# Patient Record
Sex: MALE | Race: WHITE | NOT HISPANIC OR LATINO | Employment: OTHER | URBAN - METROPOLITAN AREA
[De-identification: names, ages, dates, MRNs, and addresses within clinical notes are randomized per-mention and may not be internally consistent; named-entity substitution may affect disease eponyms.]

---

## 2018-02-19 ENCOUNTER — TRANSCRIBE ORDERS (OUTPATIENT)
Dept: ADMINISTRATIVE | Facility: HOSPITAL | Age: 78
End: 2018-02-19

## 2018-02-19 ENCOUNTER — HOSPITAL ENCOUNTER (OUTPATIENT)
Dept: GENERAL RADIOLOGY | Facility: HOSPITAL | Age: 78
Discharge: HOME | End: 2018-02-19
Attending: FAMILY MEDICINE | Admitting: FAMILY MEDICINE

## 2018-02-19 DIAGNOSIS — M25.552 LEFT HIP PAIN: ICD-10-CM

## 2018-02-19 DIAGNOSIS — M25.552 LEFT HIP PAIN: Primary | ICD-10-CM

## 2018-02-19 PROCEDURE — 73502 X-RAY EXAM HIP UNI 2-3 VIEWS: CPT

## 2018-10-19 ENCOUNTER — TRANSCRIBE ORDERS (OUTPATIENT)
Dept: ADMINISTRATIVE | Facility: HOSPITAL | Age: 78
End: 2018-10-19

## 2018-10-19 DIAGNOSIS — R10.9 ABDOMINAL PAIN, UNSPECIFIED ABDOMINAL LOCATION: Primary | ICD-10-CM

## 2018-10-23 ENCOUNTER — HOSPITAL ENCOUNTER (OUTPATIENT)
Dept: CT IMAGING | Facility: HOSPITAL | Age: 78
Discharge: HOME | End: 2018-10-23
Attending: FAMILY MEDICINE | Admitting: FAMILY MEDICINE

## 2018-10-23 DIAGNOSIS — R10.9 ABDOMINAL PAIN, UNSPECIFIED ABDOMINAL LOCATION: ICD-10-CM

## 2018-10-23 PROCEDURE — 74176 CT ABD & PELVIS W/O CONTRAST: CPT

## 2019-02-01 ENCOUNTER — TRANSCRIBE ORDERS (OUTPATIENT)
Dept: PHYSICAL THERAPY | Facility: HOSPITAL | Age: 79
End: 2019-02-01

## 2019-02-01 DIAGNOSIS — M16.9 OSTEOARTHRITIS OF HIP, UNSPECIFIED LATERALITY, UNSPECIFIED OSTEOARTHRITIS TYPE: Primary | ICD-10-CM

## 2019-02-04 ENCOUNTER — HOSPITAL ENCOUNTER (OUTPATIENT)
Dept: PHYSICAL THERAPY | Facility: HOSPITAL | Age: 79
Setting detail: THERAPIES SERIES
Discharge: HOME | End: 2019-02-04

## 2019-02-04 DIAGNOSIS — M25.552 BILATERAL HIP PAIN: Primary | ICD-10-CM

## 2019-02-04 DIAGNOSIS — M25.551 BILATERAL HIP PAIN: Primary | ICD-10-CM

## 2019-02-04 PROCEDURE — 97161 PT EVAL LOW COMPLEX 20 MIN: CPT | Performed by: PHYSICAL THERAPIST

## 2019-02-04 NOTE — THERAPY EVALUATION
Outpatient Physical Therapy Ortho Initial Evaluation  [facility]     Patient Name: Claude G Rhorer Jr.  : 1940  MRN: [id number]  Today's Date: 2019      Visit Date: 2019    There is no problem list on file for this patient.       Past Medical History:   Diagnosis Date   • Hypertension         Past Surgical History:   Procedure Laterality Date   • CHOLECYSTECTOMY     • HERNIA REPAIR         Visit Dx:     ICD-10-CM ICD-9-CM   1. Bilateral hip pain M25.551 719.45    M25.552        Patient History     Row Name 19 1300             History    Chief Complaint  Pain;Difficulty Walking  -LIGIA      Type of Pain  Hip pain  -LIGIA      Date Current Problem(s) Began  17  -LIGIA      Brief Description of Current Complaint  Patient states that he has a history of left hip pain and has been receiving injections about every three months to decreasing benefit.  He notes that his left pain radiates into the groin and lateral hip.  He notes that even his right hip has began to hurt him over about jet last year.  He had an injection in it earlier this year.  Was told he has a right sided hernia by the surgeon that worked on the left hernia.  Pain is in the right groin area as well.  He is able to walk a mile at the gym and to be active, he notes pain with standing for about an hour that requires sitting and is reported as a burning pain.  He has follows up with ortho at the end of the month.  -LIGIA      Current Tobacco Use  nonuser  -LIGIA      Hand Dominance  right-handed  -LIGIA      Occupation/sports/leisure activities  retired  worked with [employer]  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  walking standing moving  -LIGIA       What Performance Factors Make the Current Problem(s) BETTER?  sitting, avoidance  -LIGIA      Difficulties at work?  not currently working  -LIGIA      Difficulties with ADL's?  LE dressing  -LIGIA      Difficulties with recreational activities?  driving/riding  -LIGIA         Fall Risk Assessment    Any falls in the past year:  No  -LIGIA         Daily Activities    Primary Language  English  -LIGIA      Barriers to learning  None  -LIGIA      Pt Participated in POC and Goals  Yes  -LIGIA         Safety    Are you being hurt, hit, or frightened by anyone at home or in your life?  No  -LIGIA        User Key  (r) = Recorded By, (t) = Taken By, (c) = Cosigned By    Initials Name Provider Type    LIGIA Zac Nelson, PT Physical Therapist          PT Ortho     Row Name 02/04/19 1400       Posture/Observations    Posture/Observations Comments  flat back appearance, pleasant and in NAD.  -LIGIA       Quarter Clearing    Quarter Clearing  Lower Quarter Clearing  -LIGIA       DTR- Lower Quarter Clearing    Patellar tendon (L2-4)  Bilateral:;2- Normal response  -LIGIA    Achilles tendon (S1-2)  Bilateral:;1- Minimal response  -LIGIA       Neural Tension Signs- Lower Quarter Clearing    Slump  Negative  -LIGIA    SLR  Negative  -LIGIA       Pathological Reflexes- Lower Quarter Clearing    Clonus  Negative  -LIGIA    Wright  Negative  -LIGIA       Sensory Screen for Light Touch- Lower Quarter Clearing    L2 (anterior mid thigh)  Intact  -LIGIA    L3 (distal anterior thigh)  Intact  -LIGIA    L4 (medial lower leg/foot)  Intact  -LIGIA    L5 (lateral lower leg/great toe)  Intact  -LIGIA    S1 (bottom of foot)  Intact  -LIGIA       Myotomal Screen- Lower Quarter Clearing    Hip flexion (L2)  Bilateral:;4+ (Good +)  -LIGIA    Knee extension (L3)  Bilateral:;5 (Normal)  -LIGIA    Ankle DF (L4)  Bilateral:;5 (Normal)  -LIGIA    Great toe extension (L5)  Bilateral:;WNL  -LIGIA    Ankle PF (S1)  Bilateral:;WNL  -LIGIA    Knee flexion (S2)  Bilateral:;5 (Normal)  -LIGIA       Lumbar ROM Screen- Lower Quarter Clearing     Lumbar Flexion  Normal  -LIGIA       SI/Hip Screen- Lower Quarter Clearing    Oumar's/Tom's test  Bilateral:;Positive  -LIGIA    Posterior thigh sheer  Left:;Positive  -LIGIA       Special Tests/Palpation    Special Tests/Palpation  Hip  -LIGIA       Hip/Thigh Palpation    Hip/Thigh Palpation?  Yes  -LIGIA    Greater Trochanter  Bilateral:;Tender  -LIGIA    Anterior Capsule  Right:;Tender  -LIGIA    Iliopsoas  Bilateral:;Tender  -LIGIA    Piriformis  Bilateral:;Tender  -LIGIA    Adductors  Right:;Tender only to deep pressures  -LIGIA       Hip Special Tests    OUMAR (hip vs SI pathology)  Bilateral:;Positive  -LIGIA    Hip scour test (labral vs hip pathology)  Left:;Positive  -LIGIA    FAIR test (piriformis syndrome)  Left:;Positive  -LIGIA       MMT (Manual Muscle Testing)    General MMT Comments  see myotomes. Glute max 3/5 bilateral, glute med 3/5 bilateral  -LIGIA       Flexibility    Flexibility Tested?  Lower Extremity  -LIGIA       Lower Extremity Flexibility    Hamstrings  Bilateral:;Moderately limited  -LIGIA    Hip External Rotators  Left:;Moderately limited;Right:;Mildly limited  -LIGIA    Hip Internal Rotators  Bilateral:;Mildly limited  -LIGIA       Gait/Stairs Assessment/Training    Comment (Gait/Stairs)  decreased left stance time, decreased stride length  -LIGIA      User Key  (r) = Recorded By, (t) = Taken By, (c) = Cosigned By    Initials Name Provider Type    Zac Vasquez, PT Physical Therapist                      Therapy Education  Education Details: initiated HEP to inlcude bridges, S/L hip abd, and seated hip capsule (S)  Given: HEP  Program: New  How Provided: Verbal, Demonstration, Written  Provided to: Patient  Level of Understanding: Verbalized, Demonstrated     PT OP Goals     Row Name 02/04/19 1400          PT Short Term Goals    STG Date to Achieve  03/04/19  -LIGIA     STG 1  Patient to be compliant with initial HEP until Ortho follow up Feb 28  -LIGIA     STG 1 Progress  New  -LIGIA        Long Term Goals    LTG Date to Achieve   04/01/19  -LIGIA     LTG 1  Patient to tolerate walking over one mile without significant pain  -LIGIA     LTG 1 Progress  New  -LIGIA     LTG 2  Patient to ascend and descend one flight of stairs with one handrail and without pain  -LIGIA     LTG 2 Progress  New  -LIGIA     LTG 3  Patient to avoid the need for surgical interventions  -LIGIA     LTG 3 Progress  New  -LIGIA     LTG 4  Patient to report improved standing tolerance  -LIGIA     LTG 4 Progress  New  -LIGIA     LTG 5  Patient to improve LEFS score to at least 58/80  -LIGIA     LTG 5 Progress  New  -LIGIA        Time Calculation    PT Goal Re-Cert Due Date  05/05/19  -LIGIA       User Key  (r) = Recorded By, (t) = Taken By, (c) = Cosigned By    Initials Name Provider Type    Zac Vasquez, PT Physical Therapist          PT Assessment/Plan     Row Name 02/04/19 1400          PT Assessment    Functional Limitations  Performance in self-care ADL;Performance in leisure activities;Limitations in functional capacity and performance;Limitations in community activities;Impaired gait  -LIGIA     Impairments  Gait;Range of motion;Poor body mechanics;Pain;Impaired flexibility  -LIGIA     Assessment Comments  Patient presents with bilateral hip pain with confirmed mild to moderate 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  Right hip pain could possibly be d/t hernia per MD and left hip is considered to be eligible for hip replacement, although he is waiting for a second opinion on that at the end of the month.  He is to be gone until the end of the month and was taught HEP to do until follow up.  -LIGIA     Please refer to paper survey for additional self-reported information  Yes  -LIGIA     Rehab Potential  Fair  -LIGIA     Patient/caregiver participated in establishment of treatment plan and goals  Yes  -LIGIA     Patient would benefit from skilled therapy intervention  Yes  -LIGIA        PT Plan    PT Frequency  1x/week;2x/week  -LIGIA     Predicted Duration of Therapy Intervention (Therapy Eval)  up to 10  visits to begin at the end of the month  -LIGIA     Planned CPT's?  PT EVAL LOW COMPLEXITY: 96615;PT THER PROC EA 15 MIN: 12263;PT MANUAL THERAPY EA 15 MIN: 39551;PT NEUROMUSC RE-EDUCATION EA 15 MIN: 66152;PT GAIT TRAINING EA 15 MIN: 46055;PT ELECTRICAL STIM UNATTEND: ;PT HOT OR COLD PACK TREAT MCARE  -LIGIA     PT Plan Comments  patient to follow up after ortho f/u Feb 28th, if appropriate per MD will initiate therapy plan to inlcude hip stretching/strengthneing with core stabiliation, balance and gait training, manual and modalities as indicated  -LIGIA       User Key  (r) = Recorded By, (t) = Taken By, (c) = Cosigned By    Initials Name Provider Type    Zac Vasquez, PT Physical Therapist                                        Time Calculation:     Therapy Suggested Charges     Code   Minutes Charges    None             Start Time: 1345     Therapy Charges for Today     Code Description Service Date Service Provider Modifiers Qty    35353196155 HC PT EVAL LOW COMPLEXITY 4 2/4/2019 Zac Nelson, PT GP 1                    Zac Nelson, PT  2/4/2019

## 2019-03-01 ENCOUNTER — HOSPITAL ENCOUNTER (OUTPATIENT)
Dept: PHYSICAL THERAPY | Facility: HOSPITAL | Age: 79
Setting detail: THERAPIES SERIES
Discharge: HOME | End: 2019-03-01

## 2019-03-01 DIAGNOSIS — M25.551 BILATERAL HIP PAIN: Primary | ICD-10-CM

## 2019-03-01 DIAGNOSIS — M25.552 BILATERAL HIP PAIN: Primary | ICD-10-CM

## 2019-03-01 PROCEDURE — 97110 THERAPEUTIC EXERCISES: CPT

## 2019-03-01 NOTE — THERAPY PROGRESS REPORT/RE-CERT
Outpatient Physical Therapy Ortho Progress Note  [facility]     Patient Name: Claude G Rhorer Jr.  : 1940  MRN: [id number]  Today's Date: 3/1/2019      Visit Date: 2019    Visit Dx:    ICD-10-CM ICD-9-CM   1. Bilateral hip pain M25.551 719.45    M25.552      Past Medical History:   Diagnosis Date   • Hypertension       Past Surgical History:   Procedure Laterality Date   • CHOLECYSTECTOMY     • HERNIA REPAIR         PT Ortho     Row Name 19 1300       Subjective Comments    Subjective Comments  Client reports that he continues to experience some left hip pain. He has noticed increased right groin pain recently and he says that he is probably going to need surgery to repair the hernia. He has also been battling vertigo the past week. He has difficulty with transitional movements from sit to stand and head turns, but no difficulty lying down or rolling over.   -MM       MMT Right Lower Ext    Rt Hip Flexion MMT, Gross Movement  (4+/5) good plus pain  -MM    Rt Hip Extension MMT, Gross Movement  (5/5) normal  -MM    Rt Hip ABduction MMT, Gross Movement  (4+/5) good plus  -MM    Rt Hip ADduction MMT, Gross Movement  (4+/5) good plus some pain  -MM    Rt Knee Extension MMT, Gross Movement  (5/5) normal  -MM    Rt Knee Flexion MMT, Gross Movement  (5/5) normal  -MM       MMT Left Lower Ext    Lt Hip Flexion MMT, Gross Movement  (4+/5) good plus  -MM    Lt Hip Extension MMT, Gross Movement  (5/5) normal  -MM    Lt Hip ABduction MMT, Gross Movement  (4+/5) good plus  -MM    Lt Hip ADduction MMT, Gross Movement  (4+/5) good plus  -MM    Lt Knee Extension MMT, Gross Movement  (5/5) normal  -MM    Lt Knee Flexion MMT, Gross Movement  (5/5) normal  -MM      User Key  (r) = Recorded By, (t) = Taken By, (c) = Cosigned By    Initials Name Provider Type    Demetrio Phillips, PT Physical Therapist          PT Assessment/Plan     Row Name 19 1300          PT Assessment    Assessment Comments  Client  continues with some hip pain and weakness. He believes he is likely to need surgery for right inguinal hernia. He has some hip weakness. He demonstrated a good understanding of exercises for hip strengthening.   -MM        PT Plan    PT Plan Comments  Client to follow-up with his physician and continue PT pending his doctor's recommendation. He plans to call next week.   -MM       User Key  (r) = Recorded By, (t) = Taken By, (c) = Cosigned By    Initials Name Provider Type    Demetrio Phillips PT Physical Therapist              Exercises     Row Name 03/01/19 1300             Subjective Comments    Subjective Comments  Client reports that he continues to experience some left hip pain. He has noticed increased right groin pain recently and he says that he is probably going to need surgery to repair the hernia. He has also been battling vertigo the past week. He has difficulty with transitional movements from sit to stand and head turns, but no difficulty lying down or rolling over.   -MM         Subjective Pain    Able to rate subjective pain?  yes  -MM      Pre-Treatment Pain Level  5  -MM      Post-Treatment Pain Level  5  -MM         Total Minutes    09119 - PT Therapeutic Exercise Minutes  30  -MM         Exercise 1    Exercise Name 1  Updated and reviewed exercises for home. Exercises included: 3-way leg kicks with green band, side step with band, diagonal step with band, sit to stand, and bridging.   -MM      Cueing 1  Verbal  -MM      Time 1  30  -MM      Additional Comments  ther ex  -MM        User Key  (r) = Recorded By, (t) = Taken By, (c) = Cosigned By    Initials Name Provider Type    Demetrio Phillips PT Physical Therapist          PT OP Goals     Row Name 03/01/19 1300          PT Short Term Goals    STG Date to Achieve  03/04/19  -MM     STG 1  Patient to be compliant with initial HEP until Ortho follow up Feb 28  -MM     STG 1 Progress  Met  -MM        Long Term Goals    LTG Date to Achieve   04/01/19  -MM     LTG 1  Patient to tolerate walking over one mile without significant pain  -MM     LTG 1 Progress  Ongoing  -MM     LTG 2  Patient to ascend and descend one flight of stairs with one handrail and without pain  -MM     LTG 2 Progress  Ongoing  -MM     LTG 3  Patient to avoid the need for surgical interventions  -MM     LTG 3 Progress  Ongoing  -MM     LTG 4  Patient to report improved standing tolerance  -MM     LTG 4 Progress  Ongoing  -MM     LTG 5  Patient to improve LEFS score to at least 58/80  -MM     LTG 5 Progress  Ongoing  -MM        Time Calculation    PT Goal Re-Cert Due Date  05/05/19  -MM       User Key  (r) = Recorded By, (t) = Taken By, (c) = Cosigned By    Initials Name Provider Type    Demetrio Phillips, PT Physical Therapist        Outcome Measure Options: Lower Extremity Functional Scale (LEFS)(61%)  Lower Extremity Functional Index  Any of your usual work, housework or school activities: Moderate difficulty  Your usual hobbies, recreational or sporting activities: Moderate difficulty  Getting into or out of the bath: A little bit of difficulty  Walking between rooms: No difficulty  Putting on your shoes or socks: No difficulty  Squatting: A little bit of difficulty  Lifting an object, like a bag of groceries from the floor: No difficulty  Performing light activities around your home: A little bit of difficulty  Performing heavy activities around your home: Moderate difficulty  Getting into or out of a car: Moderate difficulty  Walking 2 blocks: No difficulty  Walking a mile: A little bit of difficulty  Going up or down 10 stairs (about 1 flight of stairs): A little bit of difficulty  Standing for 1 hour: Moderate difficulty  Sitting for 1 hour: No difficulty  Running on even ground: Extreme difficulty or unable to perform activity  Running on uneven ground: Extreme difficulty or unable to perform activity  Making sharp turns while running fast: Extreme difficulty or unable to  perform activity  Hopping: Extreme difficulty or unable to perform activity  Rolling over in bed: No difficulty  Total: 49      Time Calculation:   Start Time: 1300  Therapy Suggested Charges     Code   Minutes Charges    75658 (CPT®) Hc Pt Neuromusc Re Education Ea 15 Min      90971 (CPT®) Hc Pt Ther Proc Ea 15 Min 30 2    97985 (CPT®) Hc Gait Training Ea 15 Min      71000 (CPT®) Hc Pt Therapeutic Act Ea 15 Min      49929 (CPT®) Hc Pt Manual Therapy Ea 15 Min      46305 (CPT®) Hc Pt Ther Massage- Per 15 Min      59973 (CPT®) Hc Pt Iontophoresis Ea 15 Min      43964 (CPT®) Hc Pt Elec Stim Ea-Per 15 Min      47732 (CPT®) Hc Pt Ultrasound Ea 15 Min      91629 (CPT®) Hc Pt Self Care/Mgmt/Train Ea 15 Min      59305 (CPT®) Hc Pt Prosthetic (S) Train Initial Encounter, Each 15 Min      25695 (CPT®) Hc Orthotic(S) Mgmt/Train Initial Encounter, Each 15min      77370 (CPT®) Hc Pt Aquatic Therapy Ea 15 Min      14031 (CPT®) Hc Pt Orthotic(S)/Prosthetic(S) Encounter, Each 15 Min       (CPT®) Hc Pt Electrical Stim Unattended      Total  30 2        Therapy Charges for Today     Code Description Service Date Service Provider Modifiers Qty    38453938450 HC PT THER PROC EA 15 MIN 3/1/2019 Demetrio Hudson, PT GP 2          PT G-Codes  Outcome Measure Options: Lower Extremity Functional Scale (LEFS)(61%)  Total: 49         Demetrio Hudson, PT  3/1/2019

## 2019-03-27 ENCOUNTER — DOCUMENTATION (OUTPATIENT)
Dept: PHYSICAL THERAPY | Facility: HOSPITAL | Age: 79
End: 2019-03-27

## 2019-03-27 DIAGNOSIS — M25.552 BILATERAL HIP PAIN: Primary | ICD-10-CM

## 2019-03-27 DIAGNOSIS — M25.551 BILATERAL HIP PAIN: Primary | ICD-10-CM

## 2019-03-27 NOTE — THERAPY DISCHARGE NOTE
Outpatient Physical Therapy Discharge Summary         Patient Name: Claude G Rhorer Jr.  : 1940  MRN: [id number]    Today's Date: 3/27/2019    Visit Dx:    ICD-10-CM ICD-9-CM   1. Bilateral hip pain M25.551 719.45    M25.552        PT OP Goals     Row Name 19 0800          PT Short Term Goals    STG Date to Achieve  19  -LIGIA     STG 1  Patient to be compliant with initial HEP until Ortho follow up   -LIGIA     STG 1 Progress  Met  -LIGIA        Long Term Goals    LTG Date to Achieve  19  -LIGIA     LTG 1  Patient to tolerate walking over one mile without significant pain  -LIGIA     LTG 1 Progress  Ongoing  -LIGIA     LTG 2  Patient to ascend and descend one flight of stairs with one handrail and without pain  -LIGIA     LTG 2 Progress  Ongoing  -LIGIA     LTG 3  Patient to avoid the need for surgical interventions  -LIGIA     LTG 3 Progress  Ongoing  -LIGIA     LTG 4  Patient to report improved standing tolerance  -LIGIA     LTG 4 Progress  Ongoing  -LIGIA     LTG 5  Patient to improve LEFS score to at least 58/80  -LIGIA     LTG 5 Progress  Ongoing  -LIGIA       User Key  (r) = Recorded By, (t) = Taken By, (c) = Cosigned By    Initials Name Provider Type    Zac Vasquez, PT Physical Therapist          OP PT Discharge Summary  Date of Discharge: 19  Reason for Discharge: Maximum functional potential achieved, Per MD order  Outcomes Achieved: Patient able to partially acheive established goals, Refer to plan of care for updates on goals achieved  Discharge Destination: Home with home program, Unknown  Discharge Instructions/Additional Comments: Patient was to be seen about the possible inguinal hernia and to call therapy back.  He never did this, and only attended one session following evaluation.      Time Calculation:                    Zac Nelson, PT  3/27/2019

## 2019-04-08 ENCOUNTER — APPOINTMENT (OUTPATIENT)
Dept: PREADMISSION TESTING | Facility: HOSPITAL | Age: 79
End: 2019-04-08

## 2019-04-08 LAB
ANION GAP SERPL CALCULATED.3IONS-SCNC: 11 MMOL/L
BUN BLD-MCNC: 14 MG/DL (ref 8–23)
BUN/CREAT SERPL: 14.3 (ref 7–25)
CALCIUM SPEC-SCNC: 8.9 MG/DL (ref 8.6–10.5)
CHLORIDE SERPL-SCNC: 101 MMOL/L (ref 98–107)
CO2 SERPL-SCNC: 26 MMOL/L (ref 22–29)
CREAT BLD-MCNC: 0.98 MG/DL (ref 0.76–1.27)
DEPRECATED RDW RBC AUTO: 39.2 FL (ref 37–54)
ERYTHROCYTE [DISTWIDTH] IN BLOOD BY AUTOMATED COUNT: 15 % (ref 12.3–15.4)
GFR SERPL CREATININE-BSD FRML MDRD: 74 ML/MIN/1.73
GLUCOSE BLD-MCNC: 102 MG/DL (ref 65–99)
HCT VFR BLD AUTO: 41.4 % (ref 37.5–51)
HGB BLD-MCNC: 13.2 G/DL (ref 13–17.7)
MCH RBC QN AUTO: 23.5 PG (ref 26.6–33)
MCHC RBC AUTO-ENTMCNC: 31.9 G/DL (ref 31.5–35.7)
MCV RBC AUTO: 73.7 FL (ref 79–97)
PLATELET # BLD AUTO: 216 10*3/MM3 (ref 140–450)
PMV BLD AUTO: 9.5 FL (ref 6–12)
POTASSIUM BLD-SCNC: 4.8 MMOL/L (ref 3.5–5.2)
RBC # BLD AUTO: 5.62 10*6/MM3 (ref 4.14–5.8)
SODIUM BLD-SCNC: 138 MMOL/L (ref 136–145)
WBC NRBC COR # BLD: 4.32 10*3/MM3 (ref 3.4–10.8)

## 2019-04-08 PROCEDURE — 93010 ELECTROCARDIOGRAM REPORT: CPT | Performed by: INTERNAL MEDICINE

## 2019-04-08 PROCEDURE — 80048 BASIC METABOLIC PNL TOTAL CA: CPT | Performed by: SURGERY

## 2019-04-08 PROCEDURE — 93005 ELECTROCARDIOGRAM TRACING: CPT

## 2019-04-08 PROCEDURE — 36415 COLL VENOUS BLD VENIPUNCTURE: CPT

## 2019-04-08 PROCEDURE — 85027 COMPLETE CBC AUTOMATED: CPT | Performed by: SURGERY

## 2020-04-22 ENCOUNTER — TRANSCRIBE ORDERS (OUTPATIENT)
Dept: ADMINISTRATIVE | Facility: HOSPITAL | Age: 80
End: 2020-04-22

## 2020-04-22 DIAGNOSIS — R10.32 LEFT GROIN PAIN: Primary | ICD-10-CM

## 2020-04-29 ENCOUNTER — HOSPITAL ENCOUNTER (OUTPATIENT)
Dept: CT IMAGING | Facility: HOSPITAL | Age: 80
Discharge: HOME | End: 2020-04-29
Admitting: SURGERY

## 2020-04-29 DIAGNOSIS — R10.32 LEFT GROIN PAIN: ICD-10-CM

## 2020-04-29 PROCEDURE — 72192 CT PELVIS W/O DYE: CPT

## 2020-07-10 ENCOUNTER — APPOINTMENT (OUTPATIENT)
Dept: PREADMISSION TESTING | Facility: HOSPITAL | Age: 80
End: 2020-07-10

## 2020-07-10 PROCEDURE — U0002 COVID-19 LAB TEST NON-CDC: HCPCS

## 2020-07-10 PROCEDURE — C9803 HOPD COVID-19 SPEC COLLECT: HCPCS

## 2020-07-10 PROCEDURE — U0004 COV-19 TEST NON-CDC HGH THRU: HCPCS

## 2020-07-11 LAB
REF LAB TEST METHOD: NORMAL
SARS-COV-2 RNA RESP QL NAA+PROBE: NOT DETECTED

## 2020-07-14 ENCOUNTER — OUTSIDE FACILITY SERVICE (OUTPATIENT)
Dept: GASTROENTEROLOGY | Facility: CLINIC | Age: 80
End: 2020-07-14

## 2020-07-14 ENCOUNTER — LAB REQUISITION (OUTPATIENT)
Dept: LAB | Facility: HOSPITAL | Age: 80
End: 2020-07-14

## 2020-07-14 DIAGNOSIS — K21.9 GASTRO-ESOPHAGEAL REFLUX DISEASE WITHOUT ESOPHAGITIS: ICD-10-CM

## 2020-07-14 DIAGNOSIS — K21.00 GASTRO-ESOPHAGEAL REFLUX DISEASE WITH ESOPHAGITIS: ICD-10-CM

## 2020-07-14 DIAGNOSIS — K44.9 DIAPHRAGMATIC HERNIA WITHOUT OBSTRUCTION OR GANGRENE: ICD-10-CM

## 2020-07-14 PROCEDURE — 88305 TISSUE EXAM BY PATHOLOGIST: CPT | Performed by: INTERNAL MEDICINE

## 2020-07-14 PROCEDURE — 88312 SPECIAL STAINS GROUP 1: CPT | Performed by: INTERNAL MEDICINE

## 2020-07-14 PROCEDURE — G0500 MOD SEDAT ENDO SERVICE >5YRS: HCPCS | Performed by: INTERNAL MEDICINE

## 2020-07-14 PROCEDURE — 43239 EGD BIOPSY SINGLE/MULTIPLE: CPT | Performed by: INTERNAL MEDICINE

## 2020-07-15 LAB
CYTO UR: NORMAL
LAB AP CASE REPORT: NORMAL
LAB AP CLINICAL INFORMATION: NORMAL
LAB AP SPECIAL STAINS: NORMAL
PATH REPORT.FINAL DX SPEC: NORMAL
PATH REPORT.GROSS SPEC: NORMAL

## 2022-10-26 ENCOUNTER — HOSPITAL ENCOUNTER (OUTPATIENT)
Dept: GENERAL RADIOLOGY | Facility: HOSPITAL | Age: 82
Discharge: HOME | End: 2022-10-26
Admitting: INTERNAL MEDICINE

## 2022-10-26 ENCOUNTER — TRANSCRIBE ORDERS (OUTPATIENT)
Dept: ADMINISTRATIVE | Facility: HOSPITAL | Age: 82
End: 2022-10-26

## 2022-10-26 DIAGNOSIS — J40 BRONCHITIS: Primary | ICD-10-CM

## 2022-10-26 DIAGNOSIS — J40 BRONCHITIS: ICD-10-CM

## 2022-10-26 PROCEDURE — 71046 X-RAY EXAM CHEST 2 VIEWS: CPT

## 2022-11-14 ENCOUNTER — TRANSCRIBE ORDERS (OUTPATIENT)
Dept: ADMINISTRATIVE | Facility: HOSPITAL | Age: 82
End: 2022-11-14

## 2022-11-14 ENCOUNTER — HOSPITAL ENCOUNTER (OUTPATIENT)
Dept: GENERAL RADIOLOGY | Facility: HOSPITAL | Age: 82
Discharge: HOME | End: 2022-11-14
Admitting: INTERNAL MEDICINE

## 2022-11-14 DIAGNOSIS — R05.3 PERSISTENT COUGH: Primary | ICD-10-CM

## 2022-11-14 DIAGNOSIS — R05.3 PERSISTENT COUGH: ICD-10-CM

## 2022-11-14 PROCEDURE — 71046 X-RAY EXAM CHEST 2 VIEWS: CPT

## 2023-04-28 PROCEDURE — 87086 URINE CULTURE/COLONY COUNT: CPT | Performed by: NURSE PRACTITIONER

## 2023-08-29 ENCOUNTER — TRANSCRIBE ORDERS (OUTPATIENT)
Dept: DIABETES SERVICES | Facility: HOSPITAL | Age: 83
End: 2023-08-29
Payer: MEDICARE

## 2023-08-29 DIAGNOSIS — E11.9 TYPE 2 DIABETES MELLITUS WITHOUT COMPLICATION, WITHOUT LONG-TERM CURRENT USE OF INSULIN: Primary | ICD-10-CM

## 2023-10-11 ENCOUNTER — HOSPITAL ENCOUNTER (OUTPATIENT)
Dept: DIABETES SERVICES | Facility: HOSPITAL | Age: 83
Setting detail: RECURRING SERIES
Discharge: HOME | End: 2023-10-11
Payer: MEDICARE

## 2023-10-11 PROCEDURE — G0108 DIAB MANAGE TRN  PER INDIV: HCPCS

## 2023-10-12 NOTE — ADDENDUM NOTE
Encounter addended by: Manuela Valdes RD on: 10/12/2023 2:00 PM   Actions taken: Clinical Note Signed, Charge Capture section accepted

## 2023-10-12 NOTE — PROGRESS NOTES
Patient seen in person for a comprehensive diabetes class. RN and RD spent a combined 120 minutes with patient at initial class. Epic users--full notes will appear under media tab. Non Epic users--notes will be forwarded per protocol.

## 2023-11-08 ENCOUNTER — HOSPITAL ENCOUNTER (OUTPATIENT)
Dept: DIABETES SERVICES | Facility: HOSPITAL | Age: 83
Setting detail: RECURRING SERIES
Discharge: HOME | End: 2023-11-08
Payer: MEDICARE

## 2024-04-15 ENCOUNTER — EDUCATION (OUTPATIENT)
Dept: DIABETES SERVICES | Facility: HOSPITAL | Age: 84
End: 2024-04-15
Payer: MEDICARE

## 2024-04-15 NOTE — CONSULTS
Diabetes Education    Patient Name:  Claude G Rhorer Jr.  YOB: 1940  MRN: [id number]  Admit Date:  (Not on file)      Patient participated in 6 month diabetes education follow-up phone call today. Please see media tab for assessment and notes if you use EPIC. If you are not an EPIC user a copy of patient's assessment and notes will be sent per routine. Thank you.       Electronically signed by:  Nai Rao RN  04/15/24 10:58 EDT

## 2024-10-01 ENCOUNTER — PATIENT ROUNDING (BHMG ONLY) (OUTPATIENT)
Dept: CARDIOLOGY | Facility: CLINIC | Age: 84
End: 2024-10-01
Payer: MEDICARE

## 2024-10-01 ENCOUNTER — OFFICE VISIT (OUTPATIENT)
Dept: CARDIOLOGY | Facility: CLINIC | Age: 84
End: 2024-10-01
Payer: MEDICARE

## 2024-10-01 VITALS
SYSTOLIC BLOOD PRESSURE: 98 MMHG | BODY MASS INDEX: 26.04 KG/M2 | WEIGHT: 186 LBS | HEIGHT: 71 IN | HEART RATE: 84 BPM | OXYGEN SATURATION: 97 % | DIASTOLIC BLOOD PRESSURE: 58 MMHG

## 2024-10-01 DIAGNOSIS — I77.810 DILATED AORTIC ROOT: Primary | ICD-10-CM

## 2024-10-01 DIAGNOSIS — R60.0 EDEMA LEG: ICD-10-CM

## 2024-10-01 DIAGNOSIS — I51.7 LVH (LEFT VENTRICULAR HYPERTROPHY): ICD-10-CM

## 2024-10-01 PROCEDURE — 99204 OFFICE O/P NEW MOD 45 MIN: CPT | Performed by: INTERNAL MEDICINE

## 2024-10-01 PROCEDURE — 1160F RVW MEDS BY RX/DR IN RCRD: CPT | Performed by: INTERNAL MEDICINE

## 2024-10-01 PROCEDURE — 1159F MED LIST DOCD IN RCRD: CPT | Performed by: INTERNAL MEDICINE

## 2024-10-01 PROCEDURE — 93000 ELECTROCARDIOGRAM COMPLETE: CPT | Performed by: INTERNAL MEDICINE

## 2024-10-01 RX ORDER — SIMVASTATIN 20 MG
TABLET ORAL
COMMUNITY

## 2024-10-01 RX ORDER — LISINOPRIL 2.5 MG/1
TABLET ORAL
COMMUNITY
Start: 2024-09-18

## 2024-10-01 NOTE — PROGRESS NOTES
"October 1, 2024    Hello, may I speak with Claude G Rhorer Jr.? \"YES\"    My name is JANI AZUL    I am  with [facility]ARD[facility]OLOG[facility]  [address]  [place] [address] [phone].    Before we get started may I verify your date of birth? 1940    I am calling to officially welcome you to our practice and ask about your recent visit. Is this a good time to talk? yes    Tell me about your visit with us. What things went well?  \"EVERYTHING\"       We're always looking for ways to make our patients' experiences even better. Do you have recommendations on ways we may improve?  no    Overall were you satisfied with your first visit to our practice? yes       I appreciate you taking the time to speak with me today. Is there anything else I can do for you? no      Thank you, and have a great day.      "

## 2024-10-01 NOTE — PROGRESS NOTES
[facility] Cardiology  Office Note  Claude G Rhorer Jr.  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 on echocardiogram September 2024  LVH on echocardiogram September 2024  Lower extremity swelling    Allergies  Allergies   Allergen Reactions    Nabumetone Nausea Only    Chlorhexidine Rash       Current Medications    Current Outpatient Medications:     aspirin 81 MG chewable tablet, Chew 1 tablet Daily., Disp: , Rfl:     diphenoxylate-atropine (LOMOTIL) 2.5-0.025 MG per tablet, , Disp: , Rfl:     doxazosin (CARDURA) 8 MG tablet, , Disp: , Rfl:     lisinopril (PRINIVIL,ZESTRIL) 2.5 MG tablet, , Disp: , Rfl:     metFORMIN ER (GLUCOPHAGE-XR) 500 MG 24 hr tablet, , Disp: , Rfl:     pantoprazole (PROTONIX) 40 MG EC tablet, , Disp: , Rfl:     simvastatin (ZOCOR) 20 MG tablet, , Disp: , Rfl:     sucralfate (CARAFATE) 1 g tablet, , Disp: , Rfl:      History of Present Illness   Claude G Rhorer Jr. is a 84 y.o. year old male who presents for consult from Aidee Adams MD for evaluation of dilated aortic root.  Patient denies any chest pain pressure discomfort no shortness breath.  No personal nocturnal dyspnea or edema on a significant basis.  Patient does have some swelling at the level of his ankles at the end the day.  Patient awake cardiogram because such was found to have a mildly dilated aortic root of 4.3 cm.  Patient has history of cardiac testing besides a stress test over 20 years ago he states.  Patient denies history of invasive valuation.  Patient denies history echocardiogram.  Patient blood pressure is controlled at home.  Patient EKG here today is unremarkable showing sinus rhythm.  Patient otherwise without complaints here today.   "Patient is very active overall as he works out routinely and does activities in his yard routinely also.      Pt denies any chest pain, dyspnea at rest, dyspnea on exertion, orthopnea, PND, palpitations, lower extremity edema, or claudication. Pt denies history of CHF, DVT, PE, MI, CVA, TIA, or rheumatic fever.     SOCIAL HX  Social History     Socioeconomic History    Marital status:    Tobacco Use    Smoking status: Never     Passive exposure: Never    Smokeless tobacco: Never   Vaping Use    Vaping status: Never Used    Passive vaping exposure: Yes   Substance and Sexual Activity    Alcohol use: Never    Drug use: Never    Sexual activity: Defer       FAMILY HX  Family History   Problem Relation Age of Onset    No Known Problems Mother     No Known Problems Father        Vitals:    10/01/24 0914   BP: 98/58   BP Location: Right arm   Patient Position: Sitting   Pulse: 84   SpO2: 97%   Weight: 84.4 kg (186 lb)   Height: 180.3 cm (71\")     Body mass index is 25.94 kg/m².     PHYSICAL EXAMINATION:  Vitals and nursing note reviewed.   Constitutional:       Appearance: Healthy appearance. Not in distress.   Eyes:      Conjunctiva/sclera: Conjunctivae normal.      Pupils: Pupils are equal, round, and reactive to light.   HENT:      Nose: Nose normal.    Mouth/Throat:      Pharynx: Oropharynx is clear.   Neck:      Vascular: JVD normal.   Pulmonary:      Effort: Pulmonary effort is normal.      Breath sounds: Normal breath sounds. No wheezing. No rhonchi. No rales.   Cardiovascular:      PMI at left midclavicular line. Normal rate. Regular rhythm. Normal S1. Normal S2.       Murmurs: There is no murmur.      No gallop.  No click. No rub.   Pulses:     Intact distal pulses.   Abdominal:      General: Bowel sounds are normal.      Palpations: Abdomen is soft.      Tenderness: There is no abdominal tenderness.   Musculoskeletal: Normal range of motion.         General: No tenderness.      Cervical back: Normal range " "of motion. Skin:     General: Skin is warm and dry.   Neurological:      General: No focal deficit present.      Mental Status: Alert and oriented to person, place and time.      Cranial Nerves: Cranial nerves 2-12 are intact.         Diagnostic Data:  No results found for: \"CHLPL\", \"TRIG\", \"HDL\", \"LDLDIRECT\"  Lab Results   Component Value Date    GLUCOSE 102 (H) 04/08/2019    BUN 14 04/08/2019    CREATININE 0.98 04/08/2019     04/08/2019    K 4.8 04/08/2019     04/08/2019    CO2 26.0 04/08/2019     Lab Results   Component Value Date    HGBA1C 5.6 12/16/2015     Lab Results   Component Value Date    WBC 4.32 04/08/2019    HGB 13.2 04/08/2019    HCT 41.4 04/08/2019     04/08/2019         ECG 12 Lead    Date/Time: 10/1/2024 10:00 AM  Performed by: Robert Santana MD    Authorized by: Robert Santana MD  Comparison: not compared with previous ECG   Previous ECG: no previous ECG available  Rhythm: sinus rhythm  Rate: normal  Conduction: conduction normal  ST Segments: ST segments normal  T Waves: T waves normal  QRS axis: normal    Clinical impression: normal ECG          Advance Care Planning   ACP discussion was held with the patient during this visit. Patient does not have an advance directive, declines further assistance.         ASSESSMENT:  Diagnoses and all orders for this visit:    1. Dilated aortic root (Primary)  -     CT Angiogram Chest    2. Edema leg    3. LVH (left ventricular hypertrophy)        PLAN:    Undergo CTA of chest for further evaluation aorta.  Discussed this in depth with patient today to further assess further portions aorta not visualized on echocardiogram.  He and his wife voiced understanding.  Doubt patient significant LVH based on echocardiogram/EKG.  Continue to monitor blood pressure  Will see patient back in 1 year for further follow-up    Return in about 1 year (around 10/1/2025).     Robert Santana MD   "

## 2024-10-05 ENCOUNTER — HOSPITAL ENCOUNTER (OUTPATIENT)
Facility: HOSPITAL | Age: 84
Discharge: HOME | End: 2024-10-05
Payer: MEDICARE

## 2024-10-05 LAB — CREAT BLDA-MCNC: 0.9 MG/DL (ref 0.6–1.3)

## 2024-10-05 PROCEDURE — 25510000001 IOPAMIDOL PER 1 ML: Performed by: INTERNAL MEDICINE

## 2024-10-05 PROCEDURE — 71275 CT ANGIOGRAPHY CHEST: CPT

## 2024-10-05 PROCEDURE — 82565 ASSAY OF CREATININE: CPT

## 2024-10-05 RX ORDER — IOPAMIDOL 755 MG/ML
100 INJECTION, SOLUTION INTRAVASCULAR
Status: COMPLETED | OUTPATIENT
Start: 2024-10-05 | End: 2024-10-05

## 2024-10-05 RX ADMIN — IOPAMIDOL 85 ML: 755 INJECTION, SOLUTION INTRAVENOUS at 08:22

## 2024-10-07 ENCOUNTER — TELEPHONE (OUTPATIENT)
Dept: CARDIOLOGY | Facility: CLINIC | Age: 84
End: 2024-10-07
Payer: MEDICARE

## 2024-10-07 NOTE — TELEPHONE ENCOUNTER
----- Message from Beverly NIELSEN sent at 10/7/2024 10:09 AM EDT -----    ----- Message -----  From: Robert Santana MD  Sent: 10/7/2024   6:05 AM EDT  To: Beverly Tatum    Mildly dilated, will follow with echo and repeat in one year with echo for dilation

## 2024-10-07 NOTE — TELEPHONE ENCOUNTER
Spoke to pt and informed him of results. He verbalized understanding and had no further questions.

## 2024-12-13 ENCOUNTER — TELEPHONE (OUTPATIENT)
Dept: CARDIOLOGY | Facility: CLINIC | Age: 84
End: 2024-12-13
Payer: MEDICARE

## 2024-12-13 NOTE — TELEPHONE ENCOUNTER
Received fax request for cardiac clearance from [facility], Dr. Wally Botello. Pt is scheduled to have Left Total Hip Arthroplasty on 12/30/24.    Pt does have ASA on med list. If clearance is given, will pt need to stop this, and if so, how many days?    If cardiac clearance is given, I will fax to [facility], Attn: Barbie Gamez at [phone].  Along with cardiac clearance, they need last OV note and EKG.

## 2025-07-30 ENCOUNTER — PATIENT ROUNDING (BHMG ONLY) (OUTPATIENT)
Dept: URGENT CARE | Facility: CLINIC | Age: 85
End: 2025-07-30
Payer: MEDICARE

## 2025-07-30 NOTE — ED NOTES
Thank you for letting us care for you in your recent visit to our urgent care center. We would love to hear about your experience with us. Was this the first time you have visited our location?    We’re always looking for ways to make our patients’ experiences even better. Do you have any recommendations on ways we may improve?     I appreciate you taking the time to respond. Please be on the lookout for a survey about your recent visit from ralali via text or email. We would greatly appreciate if you could fill that out and turn it back in. We want your voice to be heard and we value your feedback.   Thank you for choosing [facility] for your healthcare needs.

## 2025-07-31 ENCOUNTER — TRANSCRIBE ORDERS (OUTPATIENT)
Dept: PHYSICAL THERAPY | Facility: HOSPITAL | Age: 85
End: 2025-07-31
Payer: MEDICARE

## 2025-07-31 DIAGNOSIS — S81.802S TRAUMATIC OPEN WOUND OF LOWER LEG, LEFT, SEQUELA: Primary | ICD-10-CM
